# Patient Record
(demographics unavailable — no encounter records)

---

## 2024-11-07 NOTE — ASSESSMENT
[FreeTextEntry1] : Paresthesia-etiology unclear - EMG/NCS of both upper and lower extremities - Blood work - Follow-up in a month

## 2024-11-07 NOTE — HISTORY OF PRESENT ILLNESS
[FreeTextEntry1] : It's a pleasure to see Mr. EMERALD CORTEZ In the office today. He is a 28 year -  old  who presents to the office today for the evaluation of paresthesia in various parts of his body which was associated with hives a month ago.  He has seen his medical doctor as well as a dermatologist and had blood work done, but the result of the blood work is not available to us at this time.  Neither doctor that he saw gave him a diagnosis.  The hives has gone away, and the pins and needle sensation has lessened, but still happens from time to time in various part.  He denies neck pain and back pain shooting down his arms or legs.  He also denies any change in diet, medication, supplements, environmental exposures

## 2025-01-30 NOTE — HISTORY OF PRESENT ILLNESS
[de-identified] : This is a 28 yo M who presents for neurosurgical consultation with regards to isolated cervicalgia. Symptoms have been present for 3+ years, no clear causative event or trauma identified.  He has attempted conservative efforts in the form of physical therapy/chiropractic efforts in the past which failed to provide benefit or functional gain.  He has not had any interventional treatments through pain management.  He has used OTC pain medications to temporize his pain complaints and presents at this time for further evaluation as he notes that his discomfort is steadily increasing.  No fine manipulation deficits involving the upper extremities identified, patient denies numbness, tingling, burning emanating from the neck into the arms or hands.  He denies weakness involving the upper or lower extremities.  He remains fairly active and is employed as a .  IMAGING MR C Spine- [facility] 2022- Preserved cervical vertebral body heights and alignment without acute bony pathology. No spinal canal stenosis. Normal cervical cord. Partial straightening of the normal cervical lordosis. C3-C4: Left uncovertebral joint hypertrophy and shallow disc bulge resulting in mild left foraminal stenosis.  PHYSICAL EXAM:   Constitutional: Well appearing, no distress. Malodorous HEENT: Normocephalic Atraumatic Psychiatric: Alert and oriented to all spheres, normal mood Pulmonary: No respiratory distress  Neurologic:  CN II-XII grossly intact Palpation: (+) left cervical paravertebral tenderness to palpation Strength: Full strength in all major muscle groups Sensation: Full sensation to light touch in all extremities Reflexes:   2+ biceps  2+ triceps   No Mathew's  No clonus  ROM intact  Gait: steady, walking w/o assistance.

## 2025-01-30 NOTE — ASSESSMENT
[FreeTextEntry1] : This is a 29-year-old male who presents for neurosurgical consultation with regards to isolated cervicalgia.  Patient has a prior MRI from 2022 with evidence of slight left L3-4 joint hypertrophy with a shallow disc bulge resulting in mild left neuroforaminal stenosis.  I have suggest that he undergo an updated MRI C-spine without contrast for my review in order to determine any progressive pathology leading to his severe pain complaints at this time.  He voices that he wishes to "get the issue fixed ".  I have advised that based on prior imaging there was no indication for neurosurgical intervention as his findings were relatively mild and he does not exhibit correlative symptoms.  Rather, he would likely best be managed through an interventional pain management specialist for which she expresses some hesitancy.  Ultimately, we will have a full discussion after his updated imaging review.  Maggy Cabrera PA-C